# Patient Record
Sex: FEMALE | Race: BLACK OR AFRICAN AMERICAN | Employment: FULL TIME | URBAN - METROPOLITAN AREA
[De-identification: names, ages, dates, MRNs, and addresses within clinical notes are randomized per-mention and may not be internally consistent; named-entity substitution may affect disease eponyms.]

---

## 2019-02-19 ENCOUNTER — APPOINTMENT (OUTPATIENT)
Dept: GENERAL RADIOLOGY | Facility: CLINIC | Age: 34
End: 2019-02-19
Attending: EMERGENCY MEDICINE
Payer: COMMERCIAL

## 2019-02-19 ENCOUNTER — HOSPITAL ENCOUNTER (EMERGENCY)
Facility: CLINIC | Age: 34
Discharge: HOME | End: 2019-02-19
Attending: EMERGENCY MEDICINE | Admitting: EMERGENCY MEDICINE
Payer: COMMERCIAL

## 2019-02-19 VITALS
SYSTOLIC BLOOD PRESSURE: 131 MMHG | HEART RATE: 87 BPM | RESPIRATION RATE: 18 BRPM | DIASTOLIC BLOOD PRESSURE: 71 MMHG | BODY MASS INDEX: 28.91 KG/M2 | OXYGEN SATURATION: 99 % | TEMPERATURE: 98.1 F | HEIGHT: 70 IN

## 2019-02-19 DIAGNOSIS — R07.9 ACUTE CHEST PAIN: ICD-10-CM

## 2019-02-19 LAB
ANION GAP SERPL CALCULATED.3IONS-SCNC: 4 MMOL/L (ref 3–14)
BASOPHILS # BLD AUTO: 0 10E9/L (ref 0–0.2)
BASOPHILS NFR BLD AUTO: 0.2 %
BUN SERPL-MCNC: 18 MG/DL (ref 7–30)
CALCIUM SERPL-MCNC: 8.8 MG/DL (ref 8.5–10.1)
CHLORIDE SERPL-SCNC: 109 MMOL/L (ref 94–109)
CO2 SERPL-SCNC: 26 MMOL/L (ref 20–32)
CREAT SERPL-MCNC: 0.95 MG/DL (ref 0.52–1.04)
D DIMER PPP FEU-MCNC: <0.3 UG/ML FEU (ref 0–0.5)
DIFFERENTIAL METHOD BLD: ABNORMAL
EOSINOPHIL # BLD AUTO: 0.1 10E9/L (ref 0–0.7)
EOSINOPHIL NFR BLD AUTO: 1.3 %
ERYTHROCYTE [DISTWIDTH] IN BLOOD BY AUTOMATED COUNT: 13.7 % (ref 10–15)
GFR SERPL CREATININE-BSD FRML MDRD: 79 ML/MIN/{1.73_M2}
GLUCOSE SERPL-MCNC: 96 MG/DL (ref 70–99)
HCG SERPL QL: NEGATIVE
HCT VFR BLD AUTO: 34.3 % (ref 35–47)
HGB BLD-MCNC: 11.7 G/DL (ref 11.7–15.7)
IMM GRANULOCYTES # BLD: 0 10E9/L (ref 0–0.4)
IMM GRANULOCYTES NFR BLD: 0.4 %
INTERPRETATION ECG - MUSE: NORMAL
LYMPHOCYTES # BLD AUTO: 1.8 10E9/L (ref 0.8–5.3)
LYMPHOCYTES NFR BLD AUTO: 34.2 %
MCH RBC QN AUTO: 29.3 PG (ref 26.5–33)
MCHC RBC AUTO-ENTMCNC: 34.1 G/DL (ref 31.5–36.5)
MCV RBC AUTO: 86 FL (ref 78–100)
MONOCYTES # BLD AUTO: 0.4 10E9/L (ref 0–1.3)
MONOCYTES NFR BLD AUTO: 7.5 %
NEUTROPHILS # BLD AUTO: 2.9 10E9/L (ref 1.6–8.3)
NEUTROPHILS NFR BLD AUTO: 56.4 %
NRBC # BLD AUTO: 0 10*3/UL
NRBC BLD AUTO-RTO: 0 /100
PLATELET # BLD AUTO: 250 10E9/L (ref 150–450)
POTASSIUM SERPL-SCNC: 3.7 MMOL/L (ref 3.4–5.3)
RBC # BLD AUTO: 4 10E12/L (ref 3.8–5.2)
SODIUM SERPL-SCNC: 139 MMOL/L (ref 133–144)
TROPONIN I SERPL-MCNC: <0.015 UG/L (ref 0–0.04)
WBC # BLD AUTO: 5.2 10E9/L (ref 4–11)

## 2019-02-19 PROCEDURE — 84484 ASSAY OF TROPONIN QUANT: CPT | Performed by: EMERGENCY MEDICINE

## 2019-02-19 PROCEDURE — 99285 EMERGENCY DEPT VISIT HI MDM: CPT | Mod: 25

## 2019-02-19 PROCEDURE — 93005 ELECTROCARDIOGRAM TRACING: CPT

## 2019-02-19 PROCEDURE — 80048 BASIC METABOLIC PNL TOTAL CA: CPT | Performed by: EMERGENCY MEDICINE

## 2019-02-19 PROCEDURE — 96374 THER/PROPH/DIAG INJ IV PUSH: CPT

## 2019-02-19 PROCEDURE — 25000128 H RX IP 250 OP 636: Performed by: EMERGENCY MEDICINE

## 2019-02-19 PROCEDURE — 71046 X-RAY EXAM CHEST 2 VIEWS: CPT

## 2019-02-19 PROCEDURE — 85025 COMPLETE CBC W/AUTO DIFF WBC: CPT | Performed by: EMERGENCY MEDICINE

## 2019-02-19 PROCEDURE — 85379 FIBRIN DEGRADATION QUANT: CPT | Performed by: EMERGENCY MEDICINE

## 2019-02-19 PROCEDURE — 84703 CHORIONIC GONADOTROPIN ASSAY: CPT | Performed by: EMERGENCY MEDICINE

## 2019-02-19 RX ORDER — KETOROLAC TROMETHAMINE 15 MG/ML
15 INJECTION, SOLUTION INTRAMUSCULAR; INTRAVENOUS ONCE
Status: COMPLETED | OUTPATIENT
Start: 2019-02-19 | End: 2019-02-19

## 2019-02-19 RX ADMIN — KETOROLAC TROMETHAMINE 15 MG: 15 INJECTION, SOLUTION INTRAMUSCULAR; INTRAVENOUS at 22:30

## 2019-02-19 ASSESSMENT — ENCOUNTER SYMPTOMS
CHILLS: 0
FEVER: 0
COUGH: 0
SHORTNESS OF BREATH: 0
DIARRHEA: 0
ABDOMINAL PAIN: 0
VOMITING: 0

## 2019-02-19 NOTE — ED AVS SNAPSHOT
Emergency Department  [address] [facility] [address]  Phone:  [phone]  Fax:  [phone]                                    Laci Chowdary   MRN: [id number]    Department:   Emergency Department   Date of Visit:  2/19/2019           After Visit Summary Signature Page    I have received my discharge instructions, and my questions have been answered. I have discussed any challenges I see with this plan with the nurse or doctor.    ..........................................................................................................................................  Patient/Patient Representative Signature      ..........................................................................................................................................  Patient Representative Print Name and Relationship to Patient    ..................................................               ................................................  Date                                   Time    ..........................................................................................................................................  Reviewed by Signature/Title    ...................................................              ..............................................  Date                                               Time          22EPIC Rev 08/18

## 2019-02-20 NOTE — ED PROVIDER NOTES
History     Chief Complaint:  Chest pain     HPI   Laci Chowdary is a 33 year old female who presents with chest pain. The patient has had right sided chest pain that has been intermittent for the last few months. Today, after working out ar 2030 she noticed sternal chest pain. Due to concern, the patient has visited the ED for evaluation and treatment. Upon arrival, the patient describes the chest pain as a pressure and adds that it is non-radiating. The patient denies shortness of breath, leg pain, leg swelling, diarrhea, vomiting, fever, chills, cough, cold symptoms, abdominal pain, or chance of pregnancy. She has no family history of heart disease. Of note: the patient had a miscarriage on November 23rd which the cause is still being looked into.    CARDIAC & PE/DVT RISK FACTORS:  Sex:    Female   Tobacco:   Negative   Hypertension:   Negative   Hyperlipidemia:  Negative   Diabetes:   Negative   Hormones:   Negative   Cancer:   Negative   Travel:   Negative   Surgery:   Negative   Other immobilization: Negative   Personal history:  Negative   Family history:  Negative     Allergies:  The patient has no known drug allergies.     Medications:    Mirena     Past Medical History:    LBTI  Miscarriage   Constipation    Past Surgical History:    GYN surgery - D&C  HC reconstruct angular deformity of toe     Family History:    breast cancer    Social History:  Marital Status:     Smoker:   Never   Smokeless:   Never   Alcohol:   No   Drugs:   No   Lives at:   Home   Arrives with:   Two kids   Clinic:    Unknown   Work:   Unknown     Review of Systems   Constitutional: Negative for chills and fever.   Respiratory: Negative for cough and shortness of breath.    Cardiovascular: Positive for chest pain. Negative for leg swelling.   Gastrointestinal: Negative for abdominal pain, diarrhea and vomiting.   All other systems reviewed and are negative.      Physical Exam     Patient Vitals for the past 24 hrs:    "BP Temp Temp src Pulse Heart Rate Resp SpO2 Height   02/19/19 2250 -- -- -- 87 -- 18 99 % --   02/19/19 2230 -- -- -- -- 84 13 99 % --   02/19/19 2130 -- -- -- -- 83 15 100 % --   02/19/19 2109 131/71 98.1  F (36.7  C) Oral -- 84 16 100 % 1.765 m (5' 9.5\")     Physical Exam  Nursing note and vitals reviewed.  Constitutional:  Appears well-developed and well-nourished.   HENT:   Head:    Atraumatic.   Mouth/Throat:   Oropharynx is clear and moist. No oropharyngeal exudate.   Eyes:    Pupils are equal, round, and reactive to light.   Neck:    Normal range of motion. Neck supple.      No tracheal deviation present. No thyromegaly present.   Cardiovascular:  Normal rate, regular rhythm, no murmur   Pulmonary/Chest: Breath sounds are clear and equal without wheezes or crackles. Nontender chest wall.  Abdominal:   Soft. Bowel sounds are normal. Exhibits no distension and      no mass. There is no tenderness.      There is no rebound and no guarding.   Musculoskeletal:  Exhibits no edema.   Lymphadenopathy:  No cervical adenopathy.   Neurological:   Alert and oriented to person, place, and time.   Skin:    Skin is warm and dry. No rash noted. No pallor.     Emergency Department Course   ECG:  Indication: chest pain  Time: 2107 Vent. Rate 92 bpm. VT interval 130. QRS duration 84. QT/QTc 348/430. P-R-T axis 75 40 46. Normal sinus rhythm. Nonspecific T wave abnormality. Abnormal ECG. Read time: 2135    Imaging:  Radiographic findings were communicated with the patient who voiced understanding of the findings.    XR Chest PA & LAT:   No acute pulmonary disease. as per radiology.     Laboratory:  CBC: WBC: 5.2, HGB: 11.7, PLT: 250  2120 Troponin: <0.015  BMP: WNL (Creatinine: 0.95)  D-dimer: <0.3  HCG: negative     Interventions:  2230: Toradol 15 mg IV    Emergency Department Course:  (2137) I performed an exam of the patient as documented above.     (2250) I rechecked the patient and discussed the results of their workup " thus far.      Findings and plan explained. Patient discharged home with instructions regarding supportive care, medications, and reasons to return. The importance of close follow-up was reviewed.     I personally reviewed the laboratory results with them and answered all related questions prior to discharge.    Impression & Plan    Medical Decision Making:  Laci Chowdary is a 33 years old female who has chest pain of unclear etiology. Here d-dimer is normal and I did not feel there was a concern for a pulmonary embolism. She does not have any risk factors for pulmonary embolism and she is PERC negative. There is no sign of myocardial infarction, her electrocardiogram does not show any signs of ischemia, and troponin is normal. Her chest xray does not show any pneumothorax or pneumonia and I felt she could be safely discharged home. She was feeling improved and she was instructed to take ibuprofen as needed for pain and to follow up with her primary care physician this week and to return for any worsening symptoms or shortness of breath.     Diagnosis:    ICD-10-CM    1. Acute chest pain R07.9      Disposition:  discharged to home    Scribe Disclosure:  I, Mohinder Robledo, am serving as a scribe on 2/19/2019 at 9:37 PM to personally document services performed by Sera Murry MD based on my observations and the provider's statements to me.     Mohinder Robledo  2/19/2019    EMERGENCY DEPARTMENT       Sera Murry MD  02/20/19 0023

## 2019-05-20 ENCOUNTER — HOSPITAL ENCOUNTER (EMERGENCY)
Facility: CLINIC | Age: 34
Discharge: HOME | End: 2019-05-20
Attending: EMERGENCY MEDICINE | Admitting: EMERGENCY MEDICINE
Payer: COMMERCIAL

## 2019-05-20 VITALS
HEIGHT: 69 IN | BODY MASS INDEX: 31.1 KG/M2 | SYSTOLIC BLOOD PRESSURE: 129 MMHG | TEMPERATURE: 98.6 F | WEIGHT: 210 LBS | OXYGEN SATURATION: 100 % | RESPIRATION RATE: 18 BRPM | HEART RATE: 74 BPM | DIASTOLIC BLOOD PRESSURE: 72 MMHG

## 2019-05-20 DIAGNOSIS — N64.4 BREAST PAIN, RIGHT: ICD-10-CM

## 2019-05-20 LAB
ALBUMIN SERPL-MCNC: 3.6 G/DL (ref 3.4–5)
ALP SERPL-CCNC: 73 U/L (ref 40–150)
ALT SERPL W P-5'-P-CCNC: 18 U/L (ref 0–50)
ANION GAP SERPL CALCULATED.3IONS-SCNC: 3 MMOL/L (ref 3–14)
AST SERPL W P-5'-P-CCNC: 12 U/L (ref 0–45)
BASOPHILS # BLD AUTO: 0 10E9/L (ref 0–0.2)
BASOPHILS NFR BLD AUTO: 0.3 %
BILIRUB SERPL-MCNC: 0.3 MG/DL (ref 0.2–1.3)
BUN SERPL-MCNC: 10 MG/DL (ref 7–30)
CALCIUM SERPL-MCNC: 8.8 MG/DL (ref 8.5–10.1)
CHLORIDE SERPL-SCNC: 106 MMOL/L (ref 94–109)
CO2 SERPL-SCNC: 29 MMOL/L (ref 20–32)
CREAT SERPL-MCNC: 0.8 MG/DL (ref 0.52–1.04)
DIFFERENTIAL METHOD BLD: ABNORMAL
EOSINOPHIL # BLD AUTO: 0.1 10E9/L (ref 0–0.7)
EOSINOPHIL NFR BLD AUTO: 2.6 %
ERYTHROCYTE [DISTWIDTH] IN BLOOD BY AUTOMATED COUNT: 12.9 % (ref 10–15)
GFR SERPL CREATININE-BSD FRML MDRD: >90 ML/MIN/{1.73_M2}
GLUCOSE SERPL-MCNC: 87 MG/DL (ref 70–99)
HCT VFR BLD AUTO: 33.8 % (ref 35–47)
HGB BLD-MCNC: 11.5 G/DL (ref 11.7–15.7)
IMM GRANULOCYTES # BLD: 0 10E9/L (ref 0–0.4)
IMM GRANULOCYTES NFR BLD: 0.3 %
INTERPRETATION ECG - MUSE: NORMAL
LYMPHOCYTES # BLD AUTO: 1.2 10E9/L (ref 0.8–5.3)
LYMPHOCYTES NFR BLD AUTO: 30.7 %
MCH RBC QN AUTO: 30.6 PG (ref 26.5–33)
MCHC RBC AUTO-ENTMCNC: 34 G/DL (ref 31.5–36.5)
MCV RBC AUTO: 90 FL (ref 78–100)
MONOCYTES # BLD AUTO: 0.3 10E9/L (ref 0–1.3)
MONOCYTES NFR BLD AUTO: 8.5 %
NEUTROPHILS # BLD AUTO: 2.2 10E9/L (ref 1.6–8.3)
NEUTROPHILS NFR BLD AUTO: 57.6 %
NRBC # BLD AUTO: 0 10*3/UL
NRBC BLD AUTO-RTO: 0 /100
PLATELET # BLD AUTO: 275 10E9/L (ref 150–450)
POTASSIUM SERPL-SCNC: 3.7 MMOL/L (ref 3.4–5.3)
PROT SERPL-MCNC: 8 G/DL (ref 6.8–8.8)
RBC # BLD AUTO: 3.76 10E12/L (ref 3.8–5.2)
SODIUM SERPL-SCNC: 138 MMOL/L (ref 133–144)
TROPONIN I SERPL-MCNC: <0.015 UG/L (ref 0–0.04)
WBC # BLD AUTO: 3.9 10E9/L (ref 4–11)

## 2019-05-20 PROCEDURE — 80053 COMPREHEN METABOLIC PANEL: CPT | Performed by: EMERGENCY MEDICINE

## 2019-05-20 PROCEDURE — 99283 EMERGENCY DEPT VISIT LOW MDM: CPT

## 2019-05-20 PROCEDURE — 85025 COMPLETE CBC W/AUTO DIFF WBC: CPT | Performed by: EMERGENCY MEDICINE

## 2019-05-20 PROCEDURE — 84484 ASSAY OF TROPONIN QUANT: CPT | Performed by: EMERGENCY MEDICINE

## 2019-05-20 ASSESSMENT — ENCOUNTER SYMPTOMS
NAUSEA: 0
ABDOMINAL PAIN: 0
DIAPHORESIS: 0
VOMITING: 0
BACK PAIN: 1
COLOR CHANGE: 0
FEVER: 0
SHORTNESS OF BREATH: 0

## 2019-05-20 ASSESSMENT — MIFFLIN-ST. JEOR: SCORE: 1726.93

## 2019-05-20 NOTE — ED AVS SNAPSHOT
Emergency Department  [address] [facility] [address]  Phone:  [phone]  Fax:  [phone]                                    Laci Chowdary   MRN: [id number]    Department:   Emergency Department   Date of Visit:  5/20/2019           After Visit Summary Signature Page    I have received my discharge instructions, and my questions have been answered. I have discussed any challenges I see with this plan with the nurse or doctor.    ..........................................................................................................................................  Patient/Patient Representative Signature      ..........................................................................................................................................  Patient Representative Print Name and Relationship to Patient    ..................................................               ................................................  Date                                   Time    ..........................................................................................................................................  Reviewed by Signature/Title    ...................................................              ..............................................  Date                                               Time          22EPIC Rev 08/18

## 2019-05-20 NOTE — ED PROVIDER NOTES
"  History     Chief Complaint:  Breast Pain    The history is provided by the patient.      Laci Chowdary is a 33 year old female who presents with breast pain. The patient reports some right upper chest pain and right breast pain for a couple days \"that she just woke up with\". The pain has started to radiate to the right mid lateral back (\"straight through\"). She denies any skin changes, warmth, redness, palpable mass. She denies shortness of breath, exertional, or pleuritic component. Pressure to the R anterior chest wall makes the pain worse. She denies any trauma to the area. She did recent fly from [place] to here 2 week ago. She denies any fever, abdominal pian, nausea, vomiting, calf pain, and diaphoresis.     Allergies:  No known drug allergies.    Medications:    The patient is not currently taking any prescribed medications.     Past Medical History:    Latent tuberculosis infection    Past Surgical History:    D&C Gyn surgery  Reconstruct angular deformity toe    Family History:    Cancer    Social History:  Patient is mother  Tobacco Use: No  Alcohol Use: No  PCP: Wilma Palmer     Review of Systems   Constitutional: Negative for diaphoresis and fever.   Respiratory: Negative for shortness of breath.    Cardiovascular: Positive for chest pain.        No calf pain   Gastrointestinal: Negative for abdominal pain, nausea and vomiting.   Musculoskeletal: Positive for back pain.   Skin: Negative for color change.   All other systems reviewed and are negative.    Physical Exam   First Vitals:  Patient Vitals for the past 24 hrs:   BP Temp Temp src Pulse Heart Rate Resp SpO2 Height Weight   05/20/19 1253 134/64 98.6  F (37  C) Oral 90 90 18 100 % 1.753 m (5' 9\") 95.3 kg (210 lb)     Physical Exam  General/Appearance: appears stated age, well-groomed, appears comfortable  Eyes: EOMI, no scleral injection, no icterus  ENT: MMM  Neck: supple, nl ROM, no stiffness  Cardiovascular: RRR, nl S1S2, no " m/r/g, 2+ pulses in all 4 extremities, cap refill <2sec  Respiratory: CTAB, good air movement throughout, no wheezes/rhonchi/rales, no increased WOB, no retractions  Back: no lesions  GI: abd soft, non-distended, nttp,  no HSM, no rebound, no guarding, nl BS  MSK: DAIGLE, good tone, no bony abnormality  Skin: warm and well-perfused, no rash, no edema, no ecchymosis, nl turgor  Neuro: GCS 15, alert and oriented, no gross focal neuro deficits  Psych: interacts appropriately  Heme: no petechia, no purpura, no active bleeding    Emergency Department Course     Laboratory:  CBC:  WBC 3.9 low, HGB 11.5 low,   CMP: WNL. (Creatinine 0.80)  Troponin: <0.015    Emergency Department Course:  2:11 PM Nursing notes and vitals reviewed.  I performed an exam of the patient as documented above.     3:27 PM I rechecked on and updated the patient.    3:33 PM Findings and plan explained to the patient. Patient discharged home with instructions regarding supportive care, medications, and reasons to return. The importance of close follow-up was reviewed.     Impression & Plan      Medical Decision Making:  This patient is a 33-year-old female, healthy, who presents with right-sided chest wall versus breast pain.  Pain seems to be exaggerated by pressing on the breast tissue.  There is no exertional or pleuritic component.  At this point I think ACS, cardiac etiology, pulmonary etiologies are low.  Troponin, EKG, chest x-ray are all negative.  As she is not having shortness of breath, tachycardia I doubt PE.  There is no evidence of infection.  Given that the pain seemed to begin in her axilla and then moved to the anterior chest wall I do think this requires further work-up.  I think talking to her primary about possible ultrasound versus breast MRI may be reasonable.  All of this was discussed with the patient and she feels comfortable calling her PCP.  We discussed specifically that if symptoms worsen, she gets chest pain,  shortness of breath, fever, palpable mass, redness or warmth, any other new or concerning symptoms that she should return to the ED.    Diagnosis:    ICD-10-CM    1. Breast pain, right N64.4        Disposition:  discharged to home    I, Bradley Aasen, am serving as a scribe on 5/20/2019 at 2:11 PM to personally document services performed by Danielle Yang MD based on my observations and the provider's statements to me.        Danielle Yang MD  05/20/19 1916

## 2019-05-20 NOTE — ED TRIAGE NOTES
Pt reports right upper chest and breast pain as well as right mid level back pain.  Pt denies SOB.  Denies any recent injuries or trauma.

## 2019-08-05 ENCOUNTER — HOSPITAL ENCOUNTER (EMERGENCY)
Facility: CLINIC | Age: 34
Discharge: HOME | End: 2019-08-05
Attending: NURSE PRACTITIONER | Admitting: NURSE PRACTITIONER
Payer: COMMERCIAL

## 2019-08-05 VITALS
BODY MASS INDEX: 30.06 KG/M2 | OXYGEN SATURATION: 99 % | DIASTOLIC BLOOD PRESSURE: 67 MMHG | HEART RATE: 73 BPM | SYSTOLIC BLOOD PRESSURE: 141 MMHG | TEMPERATURE: 98.3 F | RESPIRATION RATE: 16 BRPM | HEIGHT: 70 IN | WEIGHT: 210 LBS

## 2019-08-05 DIAGNOSIS — N64.4 PAIN OF RIGHT BREAST: ICD-10-CM

## 2019-08-05 LAB
HCG UR QL: NEGATIVE
INTERPRETATION ECG - MUSE: NORMAL

## 2019-08-05 PROCEDURE — 81025 URINE PREGNANCY TEST: CPT | Performed by: NURSE PRACTITIONER

## 2019-08-05 PROCEDURE — 99284 EMERGENCY DEPT VISIT MOD MDM: CPT

## 2019-08-05 PROCEDURE — 93005 ELECTROCARDIOGRAM TRACING: CPT

## 2019-08-05 RX ORDER — MELOXICAM 15 MG/1
15 TABLET ORAL DAILY PRN
Qty: 14 TABLET | Refills: 0 | Status: SHIPPED | OUTPATIENT
Start: 2019-08-05 | End: 2019-08-19

## 2019-08-05 ASSESSMENT — MIFFLIN-ST. JEOR: SCORE: 1734.86

## 2019-08-05 ASSESSMENT — ENCOUNTER SYMPTOMS: BACK PAIN: 1

## 2019-08-05 NOTE — ED PROVIDER NOTES
"  History     Chief Complaint:  Breast Pain      HPI   Laci Chowdary is a 33 year old female who presents with intermittent right breast pain for the past couple of months which is worse when she is on her period. Patient notes this radiates through to her back. She states this is similar to chest wall pain and breast pain which she was evaluated for several times in February and May, but that was more centralized around the sternum and her pain now is more on the right breast only. Primary prescribed meloxicam and this improved her symptoms. She has just started her period. She has not taken any Tylenol. She is not currently breast feeding. She is currently sexually active. No new factors to bring her here today in particular, just wants to know why this continues to occur. Patient denies breast lumps or nipple discharge, fevers.      Cardiac Risk Factors   Sex: Female   Tobacco: Negative   Hypertension: Negative  Diabetes: Negative  Hyperlipidemia: Negative  Family History: Negative    Allergies:  No known drug allergies.    Medications:    The patient is not currently taking any prescribed medications.     Past Medical History:    Latent TB    Past Surgical History:    D&C  Reconstruction angular deformity toe, soft tissue    Family History:    Breast cancer- mother    Social History:  Presents to the ED by herself.   Tobacco Use: Never  Alcohol Use: No  PCP: Wilma Palmer  Marital Status:   [2]     Review of Systems   Musculoskeletal: Positive for back pain.        Positive for right breast pain.    All other systems reviewed and are negative.    Physical Exam   First Vitals:  BP: (!) 141/67  Pulse: 73  Temp: 98.3  F (36.8  C)  Resp: 16  Height: 176.5 cm (5' 9.5\")  Weight: 95.3 kg (210 lb)  SpO2: 99 %    Physical Exam  Nursing notes reviewed. Vitals reviewed.  General: Alert. Well kept.  Eyes:  Conjunctiva non-injected, non-icteric.  Neck/Throat: Moist mucous membranes. Normal voice.  Cardiac: " Regular rhythm. Normal heart sounds. 2+ radial pulses.   Pulmonary: Clear and equal breath sounds bilaterally.   Musculoskeletal: Normal gross range of motion of all 4 extremities.   Neurological: Alert and oriented x4.   Skin: Warm and dry. Right breast without erythema, induration, masses, nipple discharge, inverted nipple, orange peel texture.  Psych: Affect normal. Good eye contact.    Emergency Department Course   ECG:  @ 1727  Indication: Breast pain  Vent. Rate 57 bpm. NJ interval 140 ms. QRS duration 82 ms. QT/QTc 420/408 ms. P-R-T axis 58 41 29.   Sinus bradycardia. Otherwise normal ECG.   Read @ 1736 by Dr. Rhodes.    Laboratory:  HCG urine: Negative    Emergency Department Course:  The patient arrived in triage where vitals were measured and recorded.   The patient was then escorted back to the emergency department.   The patient's medical records were reviewed.  Nursing notes and vitals were reviewed.  1715: I performed an exam of the patient as documented above.  The above workup was undertaken.  1805: I rechecked the patient and discussed results.    Findings and plan explained to the Patient. Patient discharged home, status improved, with instructions regarding supportive care, medications, and reasons to return as well as the importance of close follow-up was reviewed. Patient was prescribed meloxicam.      Impression & Plan      Medical Decision Making:  Laci Chowdary is a 33 year old female who presents for evaluation of right breast pain. The patient notes similar symptoms for several months where during her menustral cycle she has acute onset of right-sided breast pain. She was evaluated in February and in May twice in ER and with primary care. She has had negative workup for PE, cardiovascular cause and infection. She notes symptoms completely resolve following completion of her menstrual cycle. On exam, she has a completely normal right breast without orange peel texture, no firm  masses, no nipple discharge, no erythema or induration. She has no axillary or epitrochlear adenopathy. She is afebrile. She has been doing breast self-exam with no change in her evaluation. She was given meloxicam at the end of May by PCP and this resolved her symptoms completely but she is currently out of the medication.  She is PERC negative and PE is considered unlikely. EKG, was performed for screening purposes, and shows normal sinus rhythm without signs of ischemia. I consider cardiovascular cause unlikely.  Patient's mother does have a history of breast cancer and she has not had a mammogram completed. There is no indication for emergent mammogram from the ED or any advanced imaging today. She will follow up with PCP this week for further evaluation and to discuss possible further testing as indicated by PCP. I will provide her with a new prescription for meloxicam to use as needed. She is discharged home in stable condition.     Diagnosis:    ICD-10-CM    1. Pain of right breast N64.4      Disposition:  Discharged to home.     Discharge Medications:     Medication List      Started    meloxicam 15 MG tablet  Commonly known as:  MOBIC  15 mg, Oral, DAILY PRN          Kusum KELLY am serving as a scribe on 8/5/2019 at 5:15 PM to personally document services performed by Steffi Sadler CNP, based on my observations and the provider's statements to me.    EMERGENCY DEPARTMENT       Steffi Sadler CNP  08/05/19 1081

## 2019-08-05 NOTE — ED AVS SNAPSHOT
Emergency Department  [address] [facility] [address]  Phone:  [phone]  Fax:  [phone]                                    Laci Chowdary   MRN: [id number]    Department:   Emergency Department   Date of Visit:  8/5/2019           After Visit Summary Signature Page    I have received my discharge instructions, and my questions have been answered. I have discussed any challenges I see with this plan with the nurse or doctor.    ..........................................................................................................................................  Patient/Patient Representative Signature      ..........................................................................................................................................  Patient Representative Print Name and Relationship to Patient    ..................................................               ................................................  Date                                   Time    ..........................................................................................................................................  Reviewed by Signature/Title    ...................................................              ..............................................  Date                                               Time          22EPIC Rev 08/18

## 2019-08-26 ENCOUNTER — OFFICE VISIT (OUTPATIENT)
Dept: URGENT CARE | Facility: URGENT CARE | Age: 34
End: 2019-08-26
Payer: COMMERCIAL

## 2019-08-26 VITALS
BODY MASS INDEX: 31.41 KG/M2 | HEART RATE: 92 BPM | DIASTOLIC BLOOD PRESSURE: 82 MMHG | OXYGEN SATURATION: 96 % | SYSTOLIC BLOOD PRESSURE: 128 MMHG | WEIGHT: 215.8 LBS

## 2019-08-26 DIAGNOSIS — B37.31 YEAST INFECTION OF THE VAGINA: Primary | ICD-10-CM

## 2019-08-26 DIAGNOSIS — N94.9 VAGINAL DISCOMFORT: ICD-10-CM

## 2019-08-26 LAB
SPECIMEN SOURCE: ABNORMAL
WET PREP SPEC: ABNORMAL

## 2019-08-26 PROCEDURE — 99213 OFFICE O/P EST LOW 20 MIN: CPT | Performed by: PHYSICIAN ASSISTANT

## 2019-08-26 PROCEDURE — 87491 CHLMYD TRACH DNA AMP PROBE: CPT | Performed by: PHYSICIAN ASSISTANT

## 2019-08-26 PROCEDURE — 87591 N.GONORRHOEAE DNA AMP PROB: CPT | Performed by: PHYSICIAN ASSISTANT

## 2019-08-26 PROCEDURE — 87210 SMEAR WET MOUNT SALINE/INK: CPT | Performed by: PHYSICIAN ASSISTANT

## 2019-08-26 RX ORDER — FLUCONAZOLE 150 MG/1
150 TABLET ORAL ONCE
Qty: 1 TABLET | Refills: 1 | Status: SHIPPED | OUTPATIENT
Start: 2019-08-26 | End: 2019-08-26

## 2019-08-26 NOTE — PATIENT INSTRUCTIONS
(B37.3) Yeast infection of the vagina  (primary encounter diagnosis)  Comment:   Plan: fluconazole (DIFLUCAN) 150 MG tablet            (N94.9) Vaginal discomfort  Comment:   Plan: Wet prep, NEISSERIA GONORRHOEA PCR, CHLAMYDIA         TRACHOMATIS PCR

## 2019-08-26 NOTE — PROGRESS NOTES
Patient presents with:  Urgent Care  Vaginal Problem: Pt states bumps in (inside) vaginal area, intercourse discomfort sxs 2x weeks     SUBJECTIVE:  Laci Chowdary is a 33 year old female who presents with:  1) vaginal discomfort with intercourse for 2 weeks.   2) white vaginal discharge.   3) partner thought he saw bumps in the area.  She denies any painful lesions.     Vaginal bleeding: No      Vaginal symptoms: as above      Past Medical History:   Diagnosis Date     NO ACTIVE PROBLEMS      Current Outpatient Medications   Medication Sig Dispense Refill     levonorgestrel (MIRENA) 20 MCG/24HR IUD 1 each (20 mcg) by Intrauterine route once for 1 dose 1 each 0     meloxicam (MOBIC) 15 MG tablet Take 1 tablet (15 mg) by mouth daily as needed for moderate pain 14 tablet 0     Social History     Socioeconomic History     Marital status:      Spouse name: Not on file     Number of children: Not on file     Years of education: Not on file     Highest education level: Not on file   Occupational History     Not on file   Social Needs     Financial resource strain: Not on file     Food insecurity:     Worry: Not on file     Inability: Not on file     Transportation needs:     Medical: Not on file     Non-medical: Not on file   Tobacco Use     Smoking status: Never Smoker     Smokeless tobacco: Never Used   Substance and Sexual Activity     Alcohol use: No     Drug use: No     Sexual activity: Yes     Partners: Male   Lifestyle     Physical activity:     Days per week: Not on file     Minutes per session: Not on file     Stress: Not on file   Relationships     Social connections:     Talks on phone: Not on file     Gets together: Not on file     Attends Gnosticist service: Not on file     Active member of club or organization: Not on file     Attends meetings of clubs or organizations: Not on file     Relationship status: Not on file     Intimate partner violence:     Fear of current or ex partner: Not on file      Emotionally abused: Not on file     Physically abused: Not on file     Forced sexual activity: Not on file   Other Topics Concern     Parent/sibling w/ CABG, MI or angioplasty before 65F 55M? Not Asked   Social History Narrative    Caffeine intake/servings daily - 1    Calcium intake/servings daily - 2    Exercise 2 times weekly - describe walk, dance, bike    Sunscreen used - Sometimes    Seatbelts used - Yes    Guns stored in the home - No    Self Breast Exam - No    Pap test up to date -  Yes    Eye exam up to date -  Yes    Dental exam up to date -  Yes    DEXA scan up to date -  Not Applicable    Flex Sig/Colonoscopy up to date -  Not Applicable    Mammography up to date -  Not Applicable    Immunizations reviewed and up to date - Unsure    Abuse: Current or Past (Physical, Sexual or Emotional) - No    Do you feel safe in your environment - Yes    Do you cope well with stress - Yes    Do you suffer from insomnia - No    Last updated by: Sandi Marshall  4/6/2009               ROS:   CONSTITUTIONAL:NEGATIVE for fever, chills, change in weight  INTEGUMENTARY/SKIN: as per HPI  RESP:NEGATIVE for significant cough or SOB  CV: NEGATIVE for chest pain, palpitations or peripheral edema  GI: NEGATIVE for nausea, abdominal pain, heartburn, or change in bowel habits  : as per HPI  Review of systems negative except as stated above.    OBJECTIVE:  /82   Pulse 92   Wt 97.9 kg (215 lb 12.8 oz)   SpO2 96%   BMI 31.41 kg/m    : external genitalia normal, vaginal discharge: white.  No lesions or sores.    GENERAL APPEARANCE: healthy, alert and no distress  ABDOMEN:  soft, nontender, no HSM or masses and bowel sounds normal  BACK: No CVA tenderness  SKIN: no suspicious lesions or rashes    (B37.3) Yeast infection of the vagina  (primary encounter diagnosis)  Comment:   Plan: fluconazole (DIFLUCAN) 150 MG tablet            (N94.9) Vaginal discomfort  Comment:   Plan: Wet prep, NEISSERIA GONORRHOEA PCR, CHLAMYDIA          TRACHOMATIS PCR

## 2019-08-27 LAB
C TRACH DNA SPEC QL NAA+PROBE: NEGATIVE
N GONORRHOEA DNA SPEC QL NAA+PROBE: NEGATIVE
SPECIMEN SOURCE: NORMAL
SPECIMEN SOURCE: NORMAL

## 2019-12-28 ENCOUNTER — HOSPITAL ENCOUNTER (EMERGENCY)
Facility: CLINIC | Age: 34
Discharge: HOME | End: 2019-12-28
Attending: EMERGENCY MEDICINE | Admitting: EMERGENCY MEDICINE
Payer: COMMERCIAL

## 2019-12-28 ENCOUNTER — APPOINTMENT (OUTPATIENT)
Dept: ULTRASOUND IMAGING | Facility: CLINIC | Age: 34
End: 2019-12-28
Attending: EMERGENCY MEDICINE
Payer: COMMERCIAL

## 2019-12-28 VITALS
WEIGHT: 198 LBS | DIASTOLIC BLOOD PRESSURE: 59 MMHG | RESPIRATION RATE: 16 BRPM | HEART RATE: 80 BPM | TEMPERATURE: 98.5 F | OXYGEN SATURATION: 99 % | HEIGHT: 69 IN | BODY MASS INDEX: 29.33 KG/M2 | SYSTOLIC BLOOD PRESSURE: 137 MMHG

## 2019-12-28 DIAGNOSIS — R10.2 PELVIC PAIN IN PREGNANCY: ICD-10-CM

## 2019-12-28 DIAGNOSIS — O26.899 PELVIC PAIN IN PREGNANCY: ICD-10-CM

## 2019-12-28 LAB — B-HCG SERPL-ACNC: 440 IU/L (ref 0–5)

## 2019-12-28 PROCEDURE — 84702 CHORIONIC GONADOTROPIN TEST: CPT | Performed by: EMERGENCY MEDICINE

## 2019-12-28 PROCEDURE — 99284 EMERGENCY DEPT VISIT MOD MDM: CPT | Mod: 25

## 2019-12-28 PROCEDURE — 76801 OB US < 14 WKS SINGLE FETUS: CPT

## 2019-12-28 ASSESSMENT — ENCOUNTER SYMPTOMS
FREQUENCY: 0
VOMITING: 0
ABDOMINAL PAIN: 1
HEMATURIA: 0
DYSURIA: 0
DIARRHEA: 0
NAUSEA: 0

## 2019-12-28 ASSESSMENT — MIFFLIN-ST. JEOR: SCORE: 1667.5

## 2019-12-28 NOTE — ED PROVIDER NOTES
"  History     Chief Complaint:  Abdominal pain    The history is provided by the patient.      Laci Chowdary is a 34 year old female,  at approximately 5 weeks gestation based on LMP, who presents with her family to the emergency department for evaluation of low abdominal pain. The patient reports she had her last menses on 19 and had a positive home pregnancy test two days prior to evaluation. She now states she is experiencing low abdominal pain. She denies any vaginal bleeding, vaginal discharge, dysuria, hematuria, frequency, nausea, vomiting, or diarrhea.    Allergies:  No known drug allergies     Medications:    Mobic    Past Medical History:    LTBI  Anemia    Past Surgical History:    D&C  Reconstruct toe deformity    Family History:    Breast cancer    Social History:  Smoking status: Never  Alcohol use: No  Drug use: No  The patient presents to the emergency department with her family.  PCP: Wilma Palmer  Marital Status:   [4]     Review of Systems   Gastrointestinal: Positive for abdominal pain. Negative for diarrhea, nausea and vomiting.   Genitourinary: Negative for dysuria, frequency, hematuria, vaginal bleeding and vaginal discharge.   All other systems reviewed and are negative.    Physical Exam   Patient Vitals for the past 24 hrs:   BP Temp Temp src Pulse Resp SpO2 Height Weight   19 1722 137/59 98.5  F (36.9  C) Oral 80 16 99 % 1.753 m (5' 9\") 89.8 kg (198 lb)     Physical Exam  General/Appearance: appears stated age, well-groomed, appears comfortable  Eyes: EOMI, no scleral injection, no icterus  ENT: MMM  Neck: supple, nl ROM, no stiffness  Cardiovascular: RRR, nl S1S2, no m/r/g, 2+ pulses in all 4 extremities, cap refill <2sec  Respiratory: CTAB, good air movement throughout, no wheezes/rhonchi/rales, no increased WOB, no retractions  GI: abd soft, non-distended, nttp,  no HSM, no rebound, no guarding, nl BS  MSK: DAIGLE, good tone, no bony abnormality  Skin: " warm and well-perfused, no rash, no edema, no ecchymosis, nl turgor  Neuro: GCS 15, alert and oriented, no gross focal neuro deficits  Psych: interacts appropriately  Heme: no petechia, no purpura, no active bleeding  Emergency Department Course   Imaging:  Radiology findings were communicated with the patient and family who voiced understanding of the findings.    OB US 1st trimester w transvag  IMPRESSION: No gestational sac, yolk sac or fetal pole visualized,  this could be due to early gestation. Recommend correlation with  serial beta-hCG.  As read by Radiology.    Laboratory:  Laboratory findings were communicated with the patient and family who voiced understanding of the findings.    HC (H)     Emergency Department Course:  Past medical records, nursing notes, and vitals reviewed.  173: I performed an exam of the patient and obtained history, as documented above.     Blood drawn and sent for evaluation.    The patient was sent for an OB ultrasound while in the emergency department, results above.     1855: I rechecked the patient. I reviewed the results with the Patient and answered all related questions prior to discharge.     Findings and plan explained to the Patient. Patient discharged home with instructions regarding supportive care, medications, and reasons to return. The importance of close follow-up was reviewed.  Impression & Plan   Medical Decision Making:  This patient is a 34-year-old female, , who presents with pelvic pain and home positive pregnancy test.  Here her beta quant is in the 440.  Her ultrasound, as appropriate for both dates and quant number, is nonconclusive and does not see any evidence of pregnancy.  As I explained to the patient this could mean that her pain is secondary to implantation pain, normal pregnancy, early miscarriage, or even ectopic pregnancy.  I have asked her to follow-up with her OB in 2 to 3 days to have a repeat beta quant drawn.  I did recommend  that we do a pelvic exam to look for infections but she does not want this at this time.  I have asked her to follow-up with her OB regarding this.  Otherwise she has no abdominal pain that is concerning for other surgical process.  Ovaries do not suggest torsion.  She is not having urinary symptoms to increase my concern for UTI.    Discharge Diagnosis:    ICD-10-CM   1. Pelvic pain in pregnancy O26.899    R10.2     Disposition:  Discharged to home.    Beti Tan  12/28/2019    EMERGENCY DEPARTMENT  Scribe Disclosure:  I, Beti Tan, am serving as a scribe at 5:32 PM on 12/28/2019 to document services personally performed by Danielle Yang MD based on my observations and the provider's statements to me.      Danielle Yang MD  12/28/19 1914

## 2019-12-28 NOTE — ED AVS SNAPSHOT
Emergency Department  [address] [facility] [address]  Phone:  [phone]  Fax:  [phone]                                    Laci Chowdary   MRN: [id number]    Department:   Emergency Department   Date of Visit:  12/28/2019           After Visit Summary Signature Page    I have received my discharge instructions, and my questions have been answered. I have discussed any challenges I see with this plan with the nurse or doctor.    ..........................................................................................................................................  Patient/Patient Representative Signature      ..........................................................................................................................................  Patient Representative Print Name and Relationship to Patient    ..................................................               ................................................  Date                                   Time    ..........................................................................................................................................  Reviewed by Signature/Title    ...................................................              ..............................................  Date                                               Time          22EPIC Rev 08/18

## 2019-12-31 ENCOUNTER — APPOINTMENT (OUTPATIENT)
Dept: ULTRASOUND IMAGING | Facility: CLINIC | Age: 34
End: 2019-12-31
Attending: EMERGENCY MEDICINE
Payer: COMMERCIAL

## 2019-12-31 ENCOUNTER — HOSPITAL ENCOUNTER (EMERGENCY)
Facility: CLINIC | Age: 34
Discharge: HOME | End: 2019-12-31
Attending: EMERGENCY MEDICINE | Admitting: EMERGENCY MEDICINE
Payer: COMMERCIAL

## 2019-12-31 VITALS
OXYGEN SATURATION: 100 % | HEIGHT: 69 IN | BODY MASS INDEX: 31.1 KG/M2 | WEIGHT: 210 LBS | TEMPERATURE: 97.1 F | DIASTOLIC BLOOD PRESSURE: 64 MMHG | RESPIRATION RATE: 16 BRPM | SYSTOLIC BLOOD PRESSURE: 128 MMHG

## 2019-12-31 DIAGNOSIS — O26.891 ABDOMINAL PAIN IN PREGNANCY, FIRST TRIMESTER: ICD-10-CM

## 2019-12-31 DIAGNOSIS — R10.9 ABDOMINAL PAIN IN PREGNANCY, FIRST TRIMESTER: ICD-10-CM

## 2019-12-31 LAB
B-HCG SERPL-ACNC: 1123 IU/L (ref 0–5)
HGB BLD-MCNC: 12.1 G/DL (ref 11.7–15.7)

## 2019-12-31 PROCEDURE — 36415 COLL VENOUS BLD VENIPUNCTURE: CPT

## 2019-12-31 PROCEDURE — 84702 CHORIONIC GONADOTROPIN TEST: CPT | Performed by: EMERGENCY MEDICINE

## 2019-12-31 PROCEDURE — 99284 EMERGENCY DEPT VISIT MOD MDM: CPT | Mod: 25

## 2019-12-31 PROCEDURE — 76801 OB US < 14 WKS SINGLE FETUS: CPT

## 2019-12-31 PROCEDURE — 36415 COLL VENOUS BLD VENIPUNCTURE: CPT | Performed by: EMERGENCY MEDICINE

## 2019-12-31 PROCEDURE — 85018 HEMOGLOBIN: CPT | Performed by: EMERGENCY MEDICINE

## 2019-12-31 ASSESSMENT — ENCOUNTER SYMPTOMS
LIGHT-HEADEDNESS: 1
ABDOMINAL PAIN: 1

## 2019-12-31 ASSESSMENT — MIFFLIN-ST. JEOR: SCORE: 1721.93

## 2019-12-31 NOTE — ED TRIAGE NOTES
5 wks pregnant.  Abd pain started about an hour ago, with diaphoresis.  Went right to neighbors house and called EMS right away.  Has 2 living children and 1 miscarriage so worried about this pregnancy.  Here on the 28th due to the same symptoms.

## 2019-12-31 NOTE — ED PROVIDER NOTES
"  History   Chief Complaint:  Abdominal Pain     HPI   Laci Chowdary is an otherwise healthy , approximately 5-week pregnant 34 year old female who presents via EMS with abdominal pain. The patient reports that she is pregnant (last menstrual period ) and was seen for abdominal pain on  in the ED where an OB ultrasound was obtained, results below. This pain has now resolved. The patient states that this morning about one hour ago she was awoken by intense mid-lower abdominal pain as well as lightheadedness, prompting her to go to her neighbors house to contact EMS. In the ED, she continues to have pain though it is somewhat improved. She did not take any medication for the pain. The patient denies vaginal bleeding.    OB US 1st trimester w transvag- 2019:  No gestational sac, yolk sac or fetal pole visualized,  this could be due to early gestation. Recommend correlation with  serial beta-hCG.    Allergies:  No known drug allergies    Medications:   Meloxicam    Past Medical History:    LTBI     Past Surgical History:    D & C  Reconstruct angular deformity toe, soft tissue    Family History:    Breast cancer    Social History:  Smoking status: Never smoker  Alcohol use: No  OB: Dr. Zamora at [facility]    Review of Systems   Gastrointestinal: Positive for abdominal pain (mid-lower abdomen).   Genitourinary: Negative for vaginal bleeding.   Neurological: Positive for light-headedness.   All other systems reviewed and are negative.    Physical Exam   Patient Vitals for the past 24 hrs:   BP Temp Temp src Heart Rate Resp SpO2 Height Weight   19 0453 128/64 97.1  F (36.2  C) Temporal 88 16 100 % 1.753 m (5' 9\") 95.3 kg (210 lb)     Physical Exam  General: Well-nourished, appears to be resting comfortably when I enter the room  Eyes: PERRL, conjunctivae pink no scleral icterus or conjunctival injection  ENT:  Moist mucus membranes, posterior oropharynx clear without erythema or " "exudates  Respiratory:  Lungs clear to auscultation bilaterally, no crackles/rubs/wheezes.  Good air movement  CV: Normal rate and rhythm, no murmurs/rubs/gallops  GI:  Abdomen soft and non-distended.  Normoactive BS.  No tenderness, guarding or rebound  : Patient left prior to exam  Skin: Warm, dry.  No rashes or petechiae  Musculoskeletal: No peripheral edema or calf tenderness  Neuro: Alert and oriented to person/place/time  Psychiatric: Normal affect      Emergency Department Course   Imaging:  Radiographic findings were communicated with the patient who voiced understanding of the findings.    US OB <14 Weeks w Transvaginal:  1. Small gestational sac within the endometrial cavity. No yolk sac or  fetal pole visualized, likely due to early gestation.  2. Trace amount of free fluid in the pelvis near the fundus, likely  physiological.  3. 1.0 cm hypoechoic area in the anterior mid uterine body, could  represent small submucosal fibroid.  As read by Radiology.    Laboratory:  Laboratory findings were communicated with the patient who voiced understanding of the findings.    Hemoglobin: 12.1  qCG Qual: 1,123 (H)    Emergency Department Course:  The patient presents to the ED via EMS.     Past medical records, nursing notes, and vitals reviewed.   0743 I performed an exam of the patient and obtained history, as documented above.    IV inserted and blood drawn.    1050 Per ED nursing staff, patient had to leave \"right now\" and eloped prior to me discussing findings and plan with her.    Impression & Plan      Medical Decision Making:  Laci Chowdary is a 34 year old female who presents for evaluation of midline suprapubic pain in early pregnancy. She has a benign abdomen and normal vital signs.  Review or records shows she is Rh + and would not need Rhogam. US shows a likely early intrauterine gestation. The patient abruptly told the nurse she needed to leave and would not stay to speak with me and so " unfortunately I was unable to provide her with the results.     Diagnosis:    ICD-10-CM    1. Abdominal pain in pregnancy, first trimester O26.891     R10.9         Disposition:  Eloped        12/31/2019   Clint Mendoza, am serving as a scribe at 7:43 AM on 12/31/2019 to document services personally performed by Stephanie Boyd MD based on my observations and the provider's statements to me.      Stephanie Boyd MD  01/01/20 0539

## 2021-03-29 NOTE — ED NOTES
"Pt stated she needed to leave \"right now.\" MD aware. Pt signed ama form.   " March 29, 2021       Milo Myrick MD  [address]  [address]  [facility] [address]  Via In Basket      Patient: Leny Schmid   YOB: 1968   Date of Visit: 3/29/2021       Dear Dr. Myrick:    Thank you for referring Leny Schmid to me for evaluation. Below are my notes for this visit with her.    If you have questions, please do not hesitate to call me. I look forward to following your patient along with you.      Sincerely,        Sahra Jim MD        CC: No Recipients  Sahra Jim MD  3/29/2021  5:22 PM  Signed                                   Cardiology Office Visit    This visit is being performed via video to discuss Video Visit (PCP: Dr. DWIGHT Myrick) and Follow-up (c/o palpitations, HR per apple watch goes up to 120's non sustained)    Clinician Location: [place] [address] [address]    Leny is in [place] and her identity has been established.   She was informed that consent to treat includes permission to submit charges to the applicable insurance on file. Leny was advised regarding the potential risk inherent in video visits, as the assessment may be limited due to what can be seen on the screen which potentially results in an incomplete assessment; as well as either of us may discontinue the video visit if it is felt that the videoconferencing connections are not adequate for his/her situation.   22 minutes were spent in this encounter.    Chief complaint  Chief Complaint   Patient presents with   • Video Visit     PCP: Dr. DWIGHT Myrick   • Follow-up     c/o palpitations, HR per apple watch goes up to 120's non sustained       HPI:   The patient is 52 year old female presenting for a follow up appointment.      She states that her apple watch has lately been giving her variable heart rates.  Sometimes going up to the 120s transiently.  At other times it is in the 80s.  She is concerned about this.      In addition, she also has been complaining of chest discomfort which she  describes as a \"heartburn\".  She states she has not treated herself for heartburn.  This discomfort happens at any time.  Is not specifically related to exertion or at rest.  She denies any clear-cut aggravating or relieving factors or radiation of this discomfort.  However, I discussed that given that this is a virtual visit we are unable to even perform a blood pressure check or an EKG on her.    We discussed in detail that she should be evaluated in person or at an ER/urgent care.  She declines to either go to the ER or urgent care.  Prefers that I order EKG and blood work on her which she wishes to get done tomorrow.  She states that she otherwise feels well.    The patient denies complaints of shortness of breath, orthopnea or PND, palpitations, worsening lower extremity edema, TIA or strokelike symptoms, claudication, presyncope or syncope.    Review of Systems  General: No fever or chills, no loss of appetite.    No cough or hemoptysis  No GI or  disturbances  No skin disorders or blood dyscrasias.  Remainder of systems reviewed and negative.        Past Medical History:   Diagnosis Date   • Anemia    • DM (diabetes mellitus) (CMS/HCC)    • Dyslipidemia    • HTN (hypertension)    • BEN (obstructive sleep apnea)      Past Surgical History:   Procedure Laterality Date   • Hysterectomy       Family History   Problem Relation Age of Onset   • Diabetes Father    • Cancer, Prostate Father    • Cancer, Breast Paternal Grandmother      Social History     Socioeconomic History   • Marital status: /Civil Union     Spouse name: Not on file   • Number of children: Not on file   • Years of education: Not on file   • Highest education level: Not on file   Occupational History   • Not on file   Tobacco Use   • Smoking status: Never Smoker   • Smokeless tobacco: Never Used   Substance and Sexual Activity   • Alcohol use: No   • Drug use: Not on file   • Sexual activity: Not on file   Other Topics Concern   • Not on  file   Social History Narrative   • Not on file     Social Determinants of Health     Financial Resource Strain: Not on file   Food Insecurity: Not on file   Transportation Needs:    • Lack of Transportation (Medical):    • Lack of Transportation (Non-Medical):    Physical Activity:    • Days of Exercise per Week:    • Minutes of Exercise per Session:    Stress: Not on file   Social Connections:    • Social Determinants: Social Connections:    Intimate Partner Violence: Not on file     Current Medications    BLOOD GLUCOSE (ONETOUCH VERIO) TEST STRIP    Test blood sugar 2 times daily as directed.    GLUCOSE BLOOD (BLOOD GLUCOSE TEST STRIPS) STRIP    Test twice daily    NIFEDIPINE XL (PROCARDIA XL) 30 MG 24 HR TABLET    Take 1 tablet by mouth daily.    SITAGLIPTIN (JANUVIA) 25 MG TABLET    Take 1 tablet by mouth daily.    SPIRONOLACTONE (ALDACTONE) 100 MG TABLET    Take 1 tablet by mouth daily.     ALLERGIES:   Allergen Reactions   • Hydrocodone-Acetaminophen PRURITUS     Cerner Allergy Text Annotation: Norco- Pt denied           PHYSICAL EXAM   There were no vitals taken for this visit.  Physical Exam:    HEENT: PERRL, EOMI. Normocephalic.  Neck:  Supple neck.   Oral mucosa : Pink.    Endocrine: There is no obvious goiter.  Lung : Nonlabored breathing.  Neuro: Awake and alert.  Speech is clear and coherent.    Psych: Appropriate mood and affect  Skin: No obvious rash        Procedures:    ECG on 8/7/2019: Normal sinus rhythm, normal ECG    Exercise Stress test on 9/5/2019  IMPRESSION:  1.  No symptoms of angina occurred during stress. (9 mins Waldemar, 10 mets)  2.  There were no ECG changes diagnostic of ischemia.  3.  No significant dysrhythmias were noted.  4.  There was a normal blood pressure response to exercise.  5.  The patient achieved good functional class.     Risk/Extent for Ischemia is low.    TTE on 9/5/2019  STUDY CONCLUSIONS  SUMMARY:     1. Left ventricle: The cavity size is normal. Wall thickness is      normal. Systolic function is normal. The estimated ejection     fraction is 50-55%, by single plane method of disks.  2. Mitral valve: Mild regurgitation.    Calcium Score CT on 8/31/2019  IMPRESSION:  1.  Total Agatston Coronary calcium score 0.  2.  This score is in the 0-25th percentile for age and gender-matched subjects.  3.  The probability of significant CAD is very low.  Standard prevention is recommended.  4. The intention and sole purpose of this study and my reporting is evaluate for coronary   calcium.  The noncardiac structures were not evaluated for pathology.  If noncardiac structures   are to be evaluated, recommend ordering a dedicated study as clinically indicated.    Labs:  Sodium (mmol/L)   Date Value   12/08/2020 138   01/24/2020 137     Potassium (mmol/L)   Date Value   12/08/2020 4.0   01/24/2020 4.1     Chloride (mmol/L)   Date Value   12/08/2020 103   01/24/2020 103     Carbon Dioxide (mmol/L)   Date Value   12/08/2020 29   01/24/2020 28     BUN (mg/dL)   Date Value   12/08/2020 11   01/24/2020 12     Creatinine (mg/dL)   Date Value   12/08/2020 0.77   01/24/2020 0.78     Glucose (mg/dL)   Date Value   12/08/2020 140 (H)   01/24/2020 237 (H)       Hemoglobin A1C (%)   Date Value   01/24/2020 8.5 (H)   12/03/2018 6.3 (H)       CHOLESTEROL (mg/dL)   Date Value   01/24/2020 210 (H)   12/03/2018 202 (H)     HDL (mg/dL)   Date Value   01/24/2020 50   12/03/2018 49 (L)     TRIGLYCERIDE (mg/dL)   Date Value   01/24/2020 89   12/03/2018 87     CALCULATED LDL (mg/dL)   Date Value   01/24/2020 142 (H)   12/03/2018 136 (H)       TSH (mcUnits/mL)   Date Value   01/24/2020 0.618       No results found for: INR    WBC (K/mcL)   Date Value   12/08/2020 5.0     RBC (mil/mcL)   Date Value   12/08/2020 3.77 (L)     HCT (%)   Date Value   12/08/2020 34.0 (L)     HGB (g/dL)   Date Value   12/08/2020 11.1 (L)     PLT (K/mcL)   Date Value   12/08/2020 297         IMPRESSION/PLAN:  1. Palpitations    2. Chest  pain, unspecified type        Orders Placed This Encounter   • Troponin I Ultra Sensitive   • CBC with Automated Differential   • Comprehensive Metabolic Panel     Order Specific Question:   Should this test be performed fasting or random?     Answer:   Fasting   • D Dimer, Quantitative   • Electrocardiogram 12-Lead   • Cardiac holter monitor     Order Specific Question:   Type of monitor     Answer:   48 hr     Order Specific Question:   Cardiologist to Read:     Answer:   [facility]     Palpitations  -A Treadmill stress test was completed on 9/5/2019. It was negative for ischemia.  No provokable arrhythmia.  -TTE completed on 9/5/2019 showed EF of 50-55% and mild MR.   -TSH normal.  -She now reports that she sees elevated heart rate readings on her apple watch  Plan:  Holter monitor ordered    Atypical chest pain  -Given that this is a virtual visit, we could not perform an ECG or any further evaluation.  -I recommended ER/urgent care for evaluation which she declines.  She does understand the risk.  Plan:  Stat troponin and D-dimer ordered which she states she will have done at [facility].  -EKG ordered which she will also try to get done ASAP.  -I asked her to contact me after these tests are done so we can review results and then order stress test based on these results.     Hypertension: controlled  -Managed per PCP  -On spironolactone 100 mg daily. She was originally prescribed this for acne.   -Interestingly, prior to initiation of Aldactone, she had significant hypokalemia.  The possibility of Conn syndrome needs to be considered.  However, potassium level is normal now and her blood pressure is adequately controlled on the present regimen.  -On Nifedipine 30 mg daily     Dyslipidemia  -1/24/2020: Cholesterol 210, HDL 50, triglycerides 89,   -Stressed dietary and lifestyle modifications.  -Repeat lipid panel ordered    Diabetes  -On Januvia and Metformin  -Ordered a hemoglobin A1c at her  last visit which has not yet been completed.  I encouraged her to have it done.    FHx of Sudden PE, CAD, CHF  -Her coronary Calcium Score was 0 in 8/2019.   -States her brother had massive pulmonary embolism, etiology unclear.  She has been followed by hematology service.   -1 of her sisters also has severe CAD with prior bypass and diabetes.    Reviewed recent labs and testing with her in detail and all her questions were answered.    Sahra Jim MD, FACC